# Patient Record
Sex: FEMALE | Race: WHITE | NOT HISPANIC OR LATINO | URBAN - METROPOLITAN AREA
[De-identification: names, ages, dates, MRNs, and addresses within clinical notes are randomized per-mention and may not be internally consistent; named-entity substitution may affect disease eponyms.]

---

## 2020-03-17 ENCOUNTER — AMBULATORY - HEALTHEAST (OUTPATIENT)
Dept: GERIATRICS | Facility: CLINIC | Age: 50
End: 2020-03-17

## 2020-03-18 ENCOUNTER — OFFICE VISIT - HEALTHEAST (OUTPATIENT)
Dept: GERIATRICS | Facility: CLINIC | Age: 50
End: 2020-03-18

## 2020-03-18 ENCOUNTER — AMBULATORY - HEALTHEAST (OUTPATIENT)
Dept: ADMINISTRATIVE | Facility: CLINIC | Age: 50
End: 2020-03-18

## 2020-03-18 DIAGNOSIS — E11.9 TYPE 2 DIABETES MELLITUS WITHOUT COMPLICATION, WITHOUT LONG-TERM CURRENT USE OF INSULIN (H): ICD-10-CM

## 2020-03-18 DIAGNOSIS — E78.5 HYPERLIPIDEMIA, UNSPECIFIED HYPERLIPIDEMIA TYPE: ICD-10-CM

## 2020-03-18 DIAGNOSIS — D64.9 ANEMIA, UNSPECIFIED TYPE: ICD-10-CM

## 2020-03-18 DIAGNOSIS — I89.0 LYMPHEDEMA OF BOTH LOWER EXTREMITIES: ICD-10-CM

## 2020-03-18 DIAGNOSIS — I10 ESSENTIAL HYPERTENSION: ICD-10-CM

## 2020-03-18 DIAGNOSIS — R19.7 DIARRHEA, UNSPECIFIED TYPE: ICD-10-CM

## 2020-03-18 DIAGNOSIS — F41.8 DEPRESSION WITH ANXIETY: ICD-10-CM

## 2020-03-18 DIAGNOSIS — L03.115 CELLULITIS OF RIGHT LOWER EXTREMITY: ICD-10-CM

## 2020-03-18 DIAGNOSIS — G47.30 SLEEP APNEA, UNSPECIFIED TYPE: ICD-10-CM

## 2020-03-18 DIAGNOSIS — I48.11 LONGSTANDING PERSISTENT ATRIAL FIBRILLATION (H): ICD-10-CM

## 2020-03-20 ENCOUNTER — OFFICE VISIT - HEALTHEAST (OUTPATIENT)
Dept: GERIATRICS | Facility: CLINIC | Age: 50
End: 2020-03-20

## 2020-03-20 ENCOUNTER — RECORDS - HEALTHEAST (OUTPATIENT)
Dept: LAB | Facility: CLINIC | Age: 50
End: 2020-03-20

## 2020-03-20 DIAGNOSIS — I89.0 LYMPHEDEMA: ICD-10-CM

## 2020-03-20 DIAGNOSIS — E11.9 TYPE 2 DIABETES MELLITUS WITHOUT COMPLICATION, WITH LONG-TERM CURRENT USE OF INSULIN (H): ICD-10-CM

## 2020-03-20 DIAGNOSIS — L03.115 CELLULITIS OF RIGHT LOWER EXTREMITY: ICD-10-CM

## 2020-03-20 DIAGNOSIS — I10 HYPERTENSION, UNSPECIFIED TYPE: ICD-10-CM

## 2020-03-20 DIAGNOSIS — Z79.4 TYPE 2 DIABETES MELLITUS WITHOUT COMPLICATION, WITH LONG-TERM CURRENT USE OF INSULIN (H): ICD-10-CM

## 2020-03-20 LAB
ANION GAP SERPL CALCULATED.3IONS-SCNC: 6 MMOL/L (ref 5–18)
BUN SERPL-MCNC: 9 MG/DL (ref 8–22)
CALCIUM SERPL-MCNC: 8.9 MG/DL (ref 8.5–10.5)
CHLORIDE BLD-SCNC: 100 MMOL/L (ref 98–107)
CO2 SERPL-SCNC: 32 MMOL/L (ref 22–31)
CREAT SERPL-MCNC: 0.71 MG/DL (ref 0.6–1.1)
ERYTHROCYTE [DISTWIDTH] IN BLOOD BY AUTOMATED COUNT: 25.2 % (ref 11–14.5)
GFR SERPL CREATININE-BSD FRML MDRD: >60 ML/MIN/1.73M2
GLUCOSE BLD-MCNC: 88 MG/DL (ref 70–125)
HCT VFR BLD AUTO: 32.7 % (ref 35–47)
HGB BLD-MCNC: 9.7 G/DL (ref 12–16)
MCH RBC QN AUTO: 24.8 PG (ref 27–34)
MCHC RBC AUTO-ENTMCNC: 29.7 G/DL (ref 32–36)
MCV RBC AUTO: 84 FL (ref 80–100)
PLATELET # BLD AUTO: 395 THOU/UL (ref 140–440)
PMV BLD AUTO: 10.2 FL (ref 8.5–12.5)
POTASSIUM BLD-SCNC: 3.6 MMOL/L (ref 3.5–5)
RBC # BLD AUTO: 3.91 MILL/UL (ref 3.8–5.4)
SODIUM SERPL-SCNC: 138 MMOL/L (ref 136–145)
WBC: 7.8 THOU/UL (ref 4–11)

## 2020-03-23 ENCOUNTER — OFFICE VISIT - HEALTHEAST (OUTPATIENT)
Dept: GERIATRICS | Facility: CLINIC | Age: 50
End: 2020-03-23

## 2020-03-23 DIAGNOSIS — E11.9 TYPE 2 DIABETES MELLITUS WITHOUT COMPLICATION, WITH LONG-TERM CURRENT USE OF INSULIN (H): ICD-10-CM

## 2020-03-23 DIAGNOSIS — Z79.4 TYPE 2 DIABETES MELLITUS WITHOUT COMPLICATION, WITH LONG-TERM CURRENT USE OF INSULIN (H): ICD-10-CM

## 2020-03-23 DIAGNOSIS — S81.801D WOUND OF RIGHT LOWER EXTREMITY, SUBSEQUENT ENCOUNTER: ICD-10-CM

## 2020-03-23 DIAGNOSIS — I10 HYPERTENSION, UNSPECIFIED TYPE: ICD-10-CM

## 2020-03-23 DIAGNOSIS — R53.1 GENERAL WEAKNESS: ICD-10-CM

## 2020-03-25 ENCOUNTER — OFFICE VISIT - HEALTHEAST (OUTPATIENT)
Dept: GERIATRICS | Facility: CLINIC | Age: 50
End: 2020-03-25

## 2020-03-25 DIAGNOSIS — I89.0 LYMPHEDEMA: ICD-10-CM

## 2020-03-25 DIAGNOSIS — E11.9 TYPE 2 DIABETES MELLITUS WITHOUT COMPLICATION, WITH LONG-TERM CURRENT USE OF INSULIN (H): ICD-10-CM

## 2020-03-25 DIAGNOSIS — T07.XXXA WOUNDS, MULTIPLE: ICD-10-CM

## 2020-03-25 DIAGNOSIS — I10 HYPERTENSION, UNSPECIFIED TYPE: ICD-10-CM

## 2020-03-25 DIAGNOSIS — Z79.4 TYPE 2 DIABETES MELLITUS WITHOUT COMPLICATION, WITH LONG-TERM CURRENT USE OF INSULIN (H): ICD-10-CM

## 2020-03-25 LAB
GLIADIN IGA SER-ACNC: 6.4 U/ML
GLIADIN IGG SER-ACNC: 0.4 U/ML
IGA SERPL-MCNC: 200 MG/DL (ref 65–400)
TTG IGA SER-ACNC: 0.3 U/ML
TTG IGG SER-ACNC: <0.6 U/ML

## 2020-03-27 ENCOUNTER — OFFICE VISIT - HEALTHEAST (OUTPATIENT)
Dept: GERIATRICS | Facility: CLINIC | Age: 50
End: 2020-03-27

## 2020-03-27 DIAGNOSIS — T07.XXXA WOUNDS, MULTIPLE: ICD-10-CM

## 2020-03-27 DIAGNOSIS — R29.898 LEG WEAKNESS, BILATERAL: ICD-10-CM

## 2020-03-27 DIAGNOSIS — Z79.4 TYPE 2 DIABETES MELLITUS WITHOUT COMPLICATION, WITH LONG-TERM CURRENT USE OF INSULIN (H): ICD-10-CM

## 2020-03-27 DIAGNOSIS — E11.9 TYPE 2 DIABETES MELLITUS WITHOUT COMPLICATION, WITH LONG-TERM CURRENT USE OF INSULIN (H): ICD-10-CM

## 2020-03-27 DIAGNOSIS — I10 HYPERTENSION, UNSPECIFIED TYPE: ICD-10-CM

## 2020-03-30 ENCOUNTER — AMBULATORY - HEALTHEAST (OUTPATIENT)
Dept: GERIATRICS | Facility: CLINIC | Age: 50
End: 2020-03-30

## 2021-06-06 NOTE — PROGRESS NOTES
Code Status:  FULL CODE  Visit Type: Review Of Multiple Medical Conditions (recently hospitalized for sepsis 2/2 cellulitis of multiple sites, afib with RVR and persistent diarrhea. )     Facility:  [facility] [[id number]]      Facility Type: SNF (Skilled Nursing Facility, TCU)    History of Present Illness:   Hospital Admission Date: 3/10/2020 Hospital Discharge Date: 3/17/2020      Nguyen Trejo is a 49 y.o. female who has a past medical history for DM2, morbid obesity, hx of gastric bypass, depression, anxiety, sleep apnea without CPAP, HTN, HLD, Afib on Eliquis, and anemia. She was recently hospitalized after stating she was dizzy and lightheaded on ha [employer] chat site in which a friend called 911.  Upon hospitalization she was found to have Sepsis with lactic acid 4.2 and WBC 17.0 which was determined 2/2 to cellulitis of LE and abdominal wall.  She was also found to be in Afib with RVR.  She was given digoxin and started on an amiodarone gttp with good rate control.  For her sepsis; she was started on empiric antibiotics of IV zosyn and vancomycin along with IVFs.  She develop fluid overload and was treated with IV diuretics.  I do not have a dc summary. Apparently, she became hypoxic due to her habitus and RAVI which did improve after treatment.  She had reported that she had been having diarrhea x 2 months and hadn't been eating much.  She was negative for CDiff and diarrhea improved.  She did have hyponatremia and hypokalemia 2/2 sepsis.      Today,  She reports feeling well.  She endorses poor endurance during therapy.  She story have have extreme lymphedema of BLE with blisterng and weeping.  Redness has almost completely resolved.  Her abdominal redness is resolved and she have 1 open area that is shallow and healing.  She reports that she has a habit of scratching and picking at her skin in which she does exhibit multiple scabs on her abdomen and arms  She reports that her diarrhea  is firming up but states she has 5 BMs daily without much warning.  She denies any abdominal pain or discomfort with BMs. She endorses heartburn which is new for her since being in the hospital which was relieved with TUMs. She tells me that she has not completed a full exam for a CPAP but is aware that she has apnea. She has OA in which she takes nabumetone, prn tylenol and gabapentin with good relief.      She reports that her BS are rarely above 100 and denies any symptoms.  She did have a FBS of 125 today and is concerned that this is too high.  She states her last A1c 3 months ago was 6.5    Past Medical History:   Diagnosis Date     Anemia      Anxiety      Atrial fibrillation with RVR (H)      B-complex deficiency      Cellulitis of right lower extremity      DDD (degenerative disc disease), lumbar      Depressive disorder      DM2 (diabetes mellitus, type 2) (H)      Eating disorder      GERD (gastroesophageal reflux disease)      Heart murmur      HLD (hyperlipidemia)      HTN (hypertension)      Hypovitaminosis D      Iron deficiency anemia secondary to inadequate dietary iron intake      Morbid obesity (H)      OA (osteoarthritis)      Restless leg      S/P gastric bypass      Septic shock (H)      Sinusitis      Sleep apnea      Sleep disturbance      Past Surgical History:   Procedure Laterality Date      SECTION       CHOLECYSTECTOMY       GASTRIC BYPASS       HERNIA REPAIR       Family History   Problem Relation Age of Onset     Cancer Father         bladder     Sleep apnea Father      Throat cancer Paternal Grandfather      Stomach cancer Paternal Grandfather      Social History     Socioeconomic History     Marital status: Single     Spouse name: Not on file     Number of children: Not on file     Years of education: Not on file     Highest education level: Not on file   Occupational History     Not on file   Social Needs     Financial resource strain: Not on file     Food insecurity      Worry: Not on file     Inability: Not on file     Transportation needs     Medical: Not on file     Non-medical: Not on file   Tobacco Use     Smoking status: Never Smoker     Smokeless tobacco: Never Used   Substance and Sexual Activity     Alcohol use: Not Currently     Drug use: Not on file     Sexual activity: Not on file   Lifestyle     Physical activity     Days per week: Not on file     Minutes per session: Not on file     Stress: Not on file   Relationships     Social connections     Talks on phone: Not on file     Gets together: Not on file     Attends Confucianism service: Not on file     Active member of club or organization: Not on file     Attends meetings of clubs or organizations: Not on file     Relationship status: Not on file     Intimate partner violence     Fear of current or ex partner: Not on file     Emotionally abused: Not on file     Physically abused: Not on file     Forced sexual activity: Not on file   Other Topics Concern     Not on file   Social History Narrative     Not on file       Current Outpatient Medications   Medication Sig Dispense Refill     calcium, as carbonate, (TUMS) 200 mg calcium (500 mg) chewable tablet Chew 2 tablets 4 (four) times a day as needed for heartburn.       acetaminophen (TYLENOL) 500 MG tablet Take 1,000 mg by mouth 2 (two) times a day as needed for pain.       apixaban ANTICOAGULANT (ELIQUIS) 5 mg Tab tablet Take 5 mg by mouth 2 (two) times a day.       ascorbic acid, vitamin C, (VITAMIN C) 1000 MG tablet Take 1,000 mg by mouth daily.       atorvastatin (LIPITOR) 40 MG tablet Take 40 mg by mouth at bedtime.       calcium carbonate-vitamin D3 (CALTRATE 600 PLUS D3) 600 mg(1,500mg) -400 unit per tablet Take 1 tablet by mouth daily.       cephalexin (KEFLEX) 500 MG capsule Take 1,000 mg by mouth every 6 (six) hours.       cyanocobalamin 1000 MCG tablet Take 1,000 mcg by mouth daily.       ferrous sulfate 325 (65 FE) MG tablet Take 1 tablet by mouth 2 (two) times  a day with meals.       furosemide (LASIX) 40 MG tablet Take 40 mg by mouth daily.       gabapentin (NEURONTIN) 100 MG capsule Take 100 mg by mouth 2 (two) times a day.       magnesium oxide 250 mg magnesium Tab Take 250 mg by mouth 2 (two) times a day.       metFORMIN (GLUCOPHAGE) 500 MG tablet Take 1,000 mg by mouth 2 (two) times a day with meals.       methylsulfonylmethane (MSM) 1,000 mg Tab Take 1,000 mg by mouth every morning.       metoprolol succinate (TOPROL-XL) 100 MG 24 hr tablet Take 100 mg by mouth daily.       multivitamin (MULTIPLE VITAMINS ORAL) Take 1 tablet by mouth every morning.       nabumetone (RELAFEN) 750 MG tablet Take 750 mg by mouth 2 (two) times a day with meals.       omeprazole (PRILOSEC) 20 MG capsule Take 20 mg by mouth daily before breakfast.       OXcarbazepine (TRILEPTAL) 150 MG tablet Take 150 mg by mouth 2 (two) times a day.       PARoxetine (PAXIL) 20 MG tablet Take 60 mg by mouth every morning.       traZODone (DESYREL) 150 MG tablet Take 150 mg by mouth at bedtime.       No current facility-administered medications for this visit.      No Known Allergies    There is no immunization history on file for this patient.    Post Discharge Medication Reconciliation Status: discharge medications reconciled, continue medications without change    Review of Systems   Patient denies fever, chills, headache, lightheadedness, dizziness, rhinorrhea, cough, congestion, shortness of breath, chest pain, palpitations, abdominal pain, n/v, constipation, change in appetite, dysuria, frequency, burning or pain with urination.  Other than stated in HPI all other review of systems is negative.         Physical Exam   Vital signs: /91 with most -120s, , resp 16, 96% on RA  GENERAL APPEARANCE: Well developed, well nourished, in no acute distress.  HEENT: normocephalic, atraumatic  PERRL, sclerae anicteric, conjunctivae clear and moist, EOM intact  LUNGS: Lung sounds CTA, no  adventitious sounds, respiratory effort normal.  CARD:irregular but controlled, S1, S2, without murmurs, gallops, rubs,   ABD: Soft and nontender with normal bowel sounds. Multiple scabs with open area LLQ   MSK: Muscle strength and tone were equal bilaterally  EXTREMITIES: lymphedema BLE  NEURO: Alert and oriented x 3.  Face is symmetric.  SKIN: blistering and ulceration of RLE with serous drainage, erythema contained to right shin less than the black marks marked in the hospital.  LLE has small minor blistering without drainage.  Wound on LLQ of abdomen is approximately 0.3 x 0.3cm with good granulation.  Abdomen has no erythema.   PSYCH: euthymic            Labs:    No results found for this or any previous visit (from the past 240 hour(s)).      Assessment:  1. Cellulitis of right lower extremity     2. Longstanding persistent atrial fibrillation     3. Lymphedema of both lower extremities     4. Type 2 diabetes mellitus without complication, without long-term current use of insulin (H)     5. Anemia, unspecified type     6. Diarrhea, unspecified type     7. Depression with anxiety     8. Sleep apnea, unspecified type     9. Essential hypertension     10. Hyperlipidemia, unspecified hyperlipidemia type         Plan:   Celllulitis: improving Keflex thru 3/20, check a BMP and CBC    Afib: controlled, continue with home dose Toprol and Eliquis    Lymphedema: counseled patient on the importance of improving lymphedema.  On lasix 40mg daily.  PT to put on lymphedema wraps if they fall of with ambulation could consider Unna boot or profore dressings.  Counseled patient to elevated legs and float heels.     DM: controlled, Metformin    Anemia: Check a CBC continue with ferous sulfate.     Diarrhea: no diagnosis, IBS vs Celiac. Discussed these dx with patient.  Discussed FODMAP diet and gluten free diet and how to go about starting.  Will check Celiac sprue.  Consider immodium if needed.     Depression/anxiety: sees  psychiatry, continue oxcarbazepine, and paxil. On trazodone for sleep .     Sleep apnea: will need to follow up for CPAP.  Discussed not adding Benadryl for itching as she is already on a highly sedating sleep medication.     HTN: Stable, continue metoprolol.     HLD: will defer to primary as she is not on a statin.      35 total minutes spent with 25 minutes spent face to face with patient in counseling and coordination of the above plan of care.     Electronically signed by: Caty Wheat CNP

## 2021-06-07 NOTE — PROGRESS NOTES
[facility]    Facility:   [facility] [[id number]]   Code Status: FULL CODE      CHIEF COMPLAINT/REASON FOR VISIT:  Chief Complaint   Patient presents with     Follow Up     rehab, legs, wounds, htn, dm       HISTORY:      HPI: Nguyen is a 49 y.o. female who I had the pleasure revisiting with secondary to her hospitalization March 10, 2020 through March 17, 2020 secondary septic shock secondary to bilateral lower extremity and abdominal wall cellulitis.  The abdominal wall is no longer an issue.  The right is greater than the left regarding some blisters as well as wounds but they are starting to scab and heal and she will be finishing up her cephalexin on March 30.  Otherwise no other pain or other problems.  She is on Eliquis secondary to A. fib.  She also does have chronic anemia see results below of her most recent hemoglobin is on ferrous sulfate twice daily.  For chronic pain on gabapentin.  For sleep is on trazodone.  Her blood sugars in the morning have been ranging 92-1 19 and towards the p.m. 89-1 24 only on metformin.  For the lymphedema is on furosemide 40 mg daily.  She has been normotensive and afebrile.  Moods are stable.  She is now going up about 7 stairs she needs to do a treadmill to go home she does figure that should be reviewed that by this weekend she like to be discharged by the weekend.  She does live in an apartment and has a daughter.  Earlier in the week she is only able to do 2 stairs.    Past Medical History:   Diagnosis Date     Anemia      Anxiety      Atrial fibrillation with RVR (H)      B-complex deficiency      Cellulitis of right lower extremity      DDD (degenerative disc disease), lumbar      Depressive disorder      DM2 (diabetes mellitus, type 2) (H)      Eating disorder      GERD (gastroesophageal reflux disease)      Heart murmur      HLD (hyperlipidemia)      HTN (hypertension)      Hypovitaminosis D      Iron deficiency anemia  secondary to inadequate dietary iron intake      Morbid obesity (H)      OA (osteoarthritis)      Restless leg      S/P gastric bypass      Septic shock (H)      Sinusitis      Sleep apnea      Sleep disturbance              Family History   Problem Relation Age of Onset     Cancer Father         bladder     Sleep apnea Father      Throat cancer Paternal Grandfather      Stomach cancer Paternal Grandfather      Social History     Socioeconomic History     Marital status: Single     Spouse name: Not on file     Number of children: Not on file     Years of education: Not on file     Highest education level: Not on file   Occupational History     Not on file   Social Needs     Financial resource strain: Not on file     Food insecurity     Worry: Not on file     Inability: Not on file     Transportation needs     Medical: Not on file     Non-medical: Not on file   Tobacco Use     Smoking status: Never Smoker     Smokeless tobacco: Never Used   Substance and Sexual Activity     Alcohol use: Not Currently     Drug use: Not on file     Sexual activity: Not on file   Lifestyle     Physical activity     Days per week: Not on file     Minutes per session: Not on file     Stress: Not on file   Relationships     Social connections     Talks on phone: Not on file     Gets together: Not on file     Attends Pentecostal service: Not on file     Active member of club or organization: Not on file     Attends meetings of clubs or organizations: Not on file     Relationship status: Not on file     Intimate partner violence     Fear of current or ex partner: Not on file     Emotionally abused: Not on file     Physically abused: Not on file     Forced sexual activity: Not on file   Other Topics Concern     Not on file   Social History Narrative     Not on file         Review of Systems  She currently denies any new symptoms of chills or fever coughing wheezing chest pain dizziness vertigo nausea vomiting diarrhea dysuria flulike symptoms  headache or stiff neck.  History of obesity sleep apnea gastric bypass surgery diabetes A. fib hypertension and hyperlipidemia.    Current Outpatient Medications   Medication Sig     acetaminophen (TYLENOL) 500 MG tablet Take 1,000 mg by mouth 2 (two) times a day as needed for pain.     apixaban ANTICOAGULANT (ELIQUIS) 5 mg Tab tablet Take 5 mg by mouth 2 (two) times a day.     ascorbic acid, vitamin C, (VITAMIN C) 1000 MG tablet Take 1,000 mg by mouth daily.     atorvastatin (LIPITOR) 40 MG tablet Take 40 mg by mouth at bedtime.     calcium carbonate-vitamin D3 (CALTRATE 600 PLUS D3) 600 mg(1,500mg) -400 unit per tablet Take 1 tablet by mouth daily.     calcium, as carbonate, (TUMS) 200 mg calcium (500 mg) chewable tablet Chew 2 tablets 4 (four) times a day as needed for heartburn.     cephalexin (KEFLEX) 500 MG capsule Take 1,000 mg by mouth every 6 (six) hours.     cyanocobalamin 1000 MCG tablet Take 1,000 mcg by mouth daily.     ferrous sulfate 325 (65 FE) MG tablet Take 1 tablet by mouth 2 (two) times a day with meals.     furosemide (LASIX) 40 MG tablet Take 40 mg by mouth daily.     gabapentin (NEURONTIN) 100 MG capsule Take 100 mg by mouth 2 (two) times a day.     magnesium oxide 250 mg magnesium Tab Take 250 mg by mouth 2 (two) times a day.     metFORMIN (GLUCOPHAGE) 500 MG tablet Take 1,000 mg by mouth 2 (two) times a day with meals.     methylsulfonylmethane (MSM) 1,000 mg Tab Take 1,000 mg by mouth every morning.     metoprolol succinate (TOPROL-XL) 100 MG 24 hr tablet Take 100 mg by mouth daily.     multivitamin (MULTIPLE VITAMINS ORAL) Take 1 tablet by mouth every morning.     nabumetone (RELAFEN) 750 MG tablet Take 750 mg by mouth 2 (two) times a day with meals.     omeprazole (PRILOSEC) 20 MG capsule Take 20 mg by mouth daily before breakfast.     OXcarbazepine (TRILEPTAL) 150 MG tablet Take 150 mg by mouth 2 (two) times a day.     PARoxetine (PAXIL) 20 MG tablet Take 60 mg by mouth every morning.      traZODone (DESYREL) 150 MG tablet Take 150 mg by mouth at bedtime.       There were no vitals filed for this visit.  Blood pressure 103/61 pulse 76 respirations 18 temperature 97.9 saturation room air 93%  Physical Exam  Head is normocephalic.  Conjunctiva is pink sclerae clear.  Neck is supple without adenopathy.  Lung sounds are clear throughout. Cardiovascular does have anirregularity.  Also does have chronic lower extremity lymphedema.  Gastrointestinal nontender protuberant with a pannus.  No secondary infection or cellulitis.  Psychiatric: Pleasant affect.    LABS:   Lab Results   Component Value Date    WBC 7.8 03/20/2020    HGB 9.7 (L) 03/20/2020    HCT 32.7 (L) 03/20/2020    MCV 84 03/20/2020     03/20/2020     Results for orders placed or performed in visit on 03/20/20   Basic Metabolic Panel   Result Value Ref Range    Sodium 138 136 - 145 mmol/L    Potassium 3.6 3.5 - 5.0 mmol/L    Chloride 100 98 - 107 mmol/L    CO2 32 (H) 22 - 31 mmol/L    Anion Gap, Calculation 6 5 - 18 mmol/L    Glucose 88 70 - 125 mg/dL    Calcium 8.9 8.5 - 10.5 mg/dL    BUN 9 8 - 22 mg/dL    Creatinine 0.71 0.60 - 1.10 mg/dL    GFR MDRD Af Amer >60 >60 mL/min/1.73m2    GFR MDRD Non Af Amer >60 >60 mL/min/1.73m2       No results found for: HGBA1C      ASSESSMENT:      ICD-10-CM    1. Wounds, multiple  T07.XXXA    2. Type 2 diabetes mellitus without complication, with long-term current use of insulin (H)  E11.9     Z79.4    3. Hypertension, unspecified type  I10    4. Lymphedema  I89.0        PLAN:    The legs actually look like they have improved nicely so on cephalexin until March 30.  Blood sugars look good blood pressures look great and the fact that she is increased now from 2 stairs up to 7 stairs her hope is to do these 8 stairs in the next day or 2 since the plan is to be able to go home by the weekend.      Electronically signed by: Michael Duane Johnson, CNP

## 2021-06-07 NOTE — PROGRESS NOTES
[facility]    Facility:   [facility] [[id number]]   Code Status: FULL CODE      CHIEF COMPLAINT/REASON FOR VISIT:  Chief Complaint   Patient presents with     Follow Up     REHAB, legs, wounds, bp, bg       HISTORY:      HPI: Nguyen is a 49 y.o. female who I had the opportunity to revisit with secondary to her hospitalization March 10 through March 17, 2020 secondary septic shock secondary to bilateral lower extremity and abdominal wall cellulitis.  She is actually doing quite well she is performing in therapy 23 stairs at this time she needs to go weight in order to go home.  She like to go home sooner than later.  For chronic pain is on gabapentin 100 mg twice daily rarely takes a Tylenol as needed.  Primarily her right lower extremity does have the blisters which are starting to dry and healed quite nicely they are wrapped.  Does have a chronic history of lymphedema.  She has been normotensive afebrile and also on room air.  Blood sugars in the morning have been ranging 90-1 25 and in the p.m. .  Moods have been stable.  No respiratory issues.  Working with therapy for strength, conditioning, stamina.    Past Medical History:   Diagnosis Date     Anemia      Anxiety      Atrial fibrillation with RVR (H)      B-complex deficiency      Cellulitis of right lower extremity      DDD (degenerative disc disease), lumbar      Depressive disorder      DM2 (diabetes mellitus, type 2) (H)      Eating disorder      GERD (gastroesophageal reflux disease)      Heart murmur      HLD (hyperlipidemia)      HTN (hypertension)      Hypovitaminosis D      Iron deficiency anemia secondary to inadequate dietary iron intake      Morbid obesity (H)      OA (osteoarthritis)      Restless leg      S/P gastric bypass      Septic shock (H)      Sinusitis      Sleep apnea      Sleep disturbance              Family History   Problem Relation Age of Onset     Cancer Father         bladder      Sleep apnea Father      Throat cancer Paternal Grandfather      Stomach cancer Paternal Grandfather      Social History     Socioeconomic History     Marital status: Single     Spouse name: Not on file     Number of children: Not on file     Years of education: Not on file     Highest education level: Not on file   Occupational History     Not on file   Social Needs     Financial resource strain: Not on file     Food insecurity     Worry: Not on file     Inability: Not on file     Transportation needs     Medical: Not on file     Non-medical: Not on file   Tobacco Use     Smoking status: Never Smoker     Smokeless tobacco: Never Used   Substance and Sexual Activity     Alcohol use: Not Currently     Drug use: Not on file     Sexual activity: Not on file   Lifestyle     Physical activity     Days per week: Not on file     Minutes per session: Not on file     Stress: Not on file   Relationships     Social connections     Talks on phone: Not on file     Gets together: Not on file     Attends [employer] service: Not on file     Active member of club or organization: Not on file     Attends meetings of clubs or organizations: Not on file     Relationship status: Not on file     Intimate partner violence     Fear of current or ex partner: Not on file     Emotionally abused: Not on file     Physically abused: Not on file     Forced sexual activity: Not on file   Other Topics Concern     Not on file   Social History Narrative     Not on file         Review of Systems  She currently denies any new symptoms of chills or fever coughing wheezing chest pain dizziness vertigo nausea vomiting diarrhea dysuria flulike symptoms headache or stiff neck.  History of obesity sleep apnea gastric bypass surgery diabetes A. fib hypertension and hyperlipidemia.    Current Outpatient Medications   Medication Sig     acetaminophen (TYLENOL) 500 MG tablet Take 1,000 mg by mouth 2 (two) times a day as needed for pain.     apixaban ANTICOAGULANT  (ELIQUIS) 5 mg Tab tablet Take 5 mg by mouth 2 (two) times a day.     ascorbic acid, vitamin C, (VITAMIN C) 1000 MG tablet Take 1,000 mg by mouth daily.     atorvastatin (LIPITOR) 40 MG tablet Take 40 mg by mouth at bedtime.     calcium carbonate-vitamin D3 (CALTRATE 600 PLUS D3) 600 mg(1,500mg) -400 unit per tablet Take 1 tablet by mouth daily.     calcium, as carbonate, (TUMS) 200 mg calcium (500 mg) chewable tablet Chew 2 tablets 4 (four) times a day as needed for heartburn.     cephalexin (KEFLEX) 500 MG capsule Take 1,000 mg by mouth every 6 (six) hours.     cyanocobalamin 1000 MCG tablet Take 1,000 mcg by mouth daily.     ferrous sulfate 325 (65 FE) MG tablet Take 1 tablet by mouth 2 (two) times a day with meals.     furosemide (LASIX) 40 MG tablet Take 40 mg by mouth daily.     gabapentin (NEURONTIN) 100 MG capsule Take 100 mg by mouth 2 (two) times a day.     magnesium oxide 250 mg magnesium Tab Take 250 mg by mouth 2 (two) times a day.     metFORMIN (GLUCOPHAGE) 500 MG tablet Take 1,000 mg by mouth 2 (two) times a day with meals.     methylsulfonylmethane (MSM) 1,000 mg Tab Take 1,000 mg by mouth every morning.     metoprolol succinate (TOPROL-XL) 100 MG 24 hr tablet Take 100 mg by mouth daily.     multivitamin (MULTIPLE VITAMINS ORAL) Take 1 tablet by mouth every morning.     nabumetone (RELAFEN) 750 MG tablet Take 750 mg by mouth 2 (two) times a day with meals.     omeprazole (PRILOSEC) 20 MG capsule Take 20 mg by mouth daily before breakfast.     OXcarbazepine (TRILEPTAL) 150 MG tablet Take 150 mg by mouth 2 (two) times a day.     PARoxetine (PAXIL) 20 MG tablet Take 60 mg by mouth every morning.     traZODone (DESYREL) 150 MG tablet Take 150 mg by mouth at bedtime.       There were no vitals filed for this visit.  Blood pressure 120/71 pulse 82 respirations 18 temperature 98.0 saturation room air 98%  Physical Exam  Head is normocephalic.  Conjunctiva is pink sclerae clear.  Neck is supple without  adenopathy.  Lung sounds are clear throughout. Cardiovascular does have a slight irregularity.  Also does have chronic lower extremity lymphedema.  Gastrointestinal nontender protuberant with a pannus.  No secondary infection or cellulitis.  Psychiatric: Pleasant affect.  LABS:   Lab Results   Component Value Date    WBC 7.8 03/20/2020    HGB 9.7 (L) 03/20/2020    HCT 32.7 (L) 03/20/2020    MCV 84 03/20/2020     03/20/2020     Results for orders placed or performed in visit on 03/20/20   Basic Metabolic Panel   Result Value Ref Range    Sodium 138 136 - 145 mmol/L    Potassium 3.6 3.5 - 5.0 mmol/L    Chloride 100 98 - 107 mmol/L    CO2 32 (H) 22 - 31 mmol/L    Anion Gap, Calculation 6 5 - 18 mmol/L    Glucose 88 70 - 125 mg/dL    Calcium 8.9 8.5 - 10.5 mg/dL    BUN 9 8 - 22 mg/dL    Creatinine 0.71 0.60 - 1.10 mg/dL    GFR MDRD Af Amer >60 >60 mL/min/1.73m2    GFR MDRD Non Af Amer >60 >60 mL/min/1.73m2       No results found for: ALT, AST, GGT, ALKPHOS, BILITOT      ASSESSMENT:      ICD-10-CM    1. Wound of right lower extremity, subsequent encounter  S81.801D    2. Type 2 diabetes mellitus without complication, with long-term current use of insulin (H)  E11.9     Z79.4    3. Hypertension, unspecified type  I10    4. General weakness  R53.1        PLAN:    The right leg in particular is healing up quite nicely with the blisters are being wrapped.  The lymphedema is is not delaying her treatment.  She finished up the cephalexin on March 30.  Blood sugars look good blood pressures look great lower extremity dry skin areas.  Continue with [employer] for A. fib.  Continue to work with therapy group for strengthening conditioning to get back to.  He did not have any other questions        Electronically signed by: Michael Duane Johnson, CNP

## 2021-06-07 NOTE — PROGRESS NOTES
[facility]    Facility:   [facility] [[id number]]   Code Status: FULL CODE      CHIEF COMPLAINT/REASON FOR VISIT:  Chief Complaint   Patient presents with     Follow Up     rehab, legs, wounds, dm, htn,       HISTORY:      HPI: Nguyen is a 49 y.o. female who I had the opportunity to revisit with secondary to not only her chronic medical conditions but also recent hospitalization March 10-17, 2020 secondary septic shock from bilateral lower extremity and abdominal wound cellulitis.  She does have lymphedema and does have primarily drying blisters on her right lower extremity.  There is no abdominal wounds.  Will finish up her cephalexin on the 30th.  The leg wounds look pretty good there is currently being treated with xerofoam and then a Kerlix dressing.  I did debride these dried wounds today.  The base looks wonderful on the bed looks wonderful and there is no secondary infection so at this point we need to dry them out so we will put a Telfa dressing to absorb the secretions and the wound itself and then wrapped it with Kerlix and clean it up twice daily with saline.  Otherwise she is not having any discomfort.  She has been normotensive and afebrile.  Blood sugars in the morning ranging 92-1 26 and towards the p.m. 89-1 24.  She now is performing 8 stairs and that was her goals goals to be able to go home soon.  Earlier in the week she is only doing 7 stairs.  Her appetite is good.  There have been no colds or flus or other problems.  Is able to use walker.    Past Medical History:   Diagnosis Date     Anemia      Anxiety      Atrial fibrillation with RVR (H)      B-complex deficiency      Cellulitis of right lower extremity      DDD (degenerative disc disease), lumbar      Depressive disorder      DM2 (diabetes mellitus, type 2) (H)      Eating disorder      GERD (gastroesophageal reflux disease)      Heart murmur      HLD (hyperlipidemia)      HTN (hypertension)       Hypovitaminosis D      Iron deficiency anemia secondary to inadequate dietary iron intake      Morbid obesity (H)      OA (osteoarthritis)      Restless leg      S/P gastric bypass      Septic shock (H)      Sinusitis      Sleep apnea      Sleep disturbance              Family History   Problem Relation Age of Onset     Cancer Father         bladder     Sleep apnea Father      Throat cancer Paternal Grandfather      Stomach cancer Paternal Grandfather      Social History     Socioeconomic History     Marital status: Single     Spouse name: Not on file     Number of children: Not on file     Years of education: Not on file     Highest education level: Not on file   Occupational History     Not on file   Social Needs     Financial resource strain: Not on file     Food insecurity     Worry: Not on file     Inability: Not on file     Transportation needs     Medical: Not on file     Non-medical: Not on file   Tobacco Use     Smoking status: Never Smoker     Smokeless tobacco: Never Used   Substance and Sexual Activity     Alcohol use: Not Currently     Drug use: Not on file     Sexual activity: Not on file   Lifestyle     Physical activity     Days per week: Not on file     Minutes per session: Not on file     Stress: Not on file   Relationships     Social connections     Talks on phone: Not on file     Gets together: Not on file     Attends Faith service: Not on file     Active member of club or organization: Not on file     Attends meetings of clubs or organizations: Not on file     Relationship status: Not on file     Intimate partner violence     Fear of current or ex partner: Not on file     Emotionally abused: Not on file     Physically abused: Not on file     Forced sexual activity: Not on file   Other Topics Concern     Not on file   Social History Narrative     Not on file         Review of Systems  She currently denies any new symptoms of chills or fever coughing wheezing chest pain dizziness vertigo  nausea vomiting diarrhea dysuria flulike symptoms headache or stiff neck.  History of obesity sleep apnea gastric bypass surgery diabetes A. fib hypertension and hyperlipidemia.         Current Outpatient Medications:      acetaminophen (TYLENOL) 500 MG tablet, Take 1,000 mg by mouth 2 (two) times a day as needed for pain., Disp: , Rfl:      apixaban ANTICOAGULANT (ELIQUIS) 5 mg Tab tablet, Take 5 mg by mouth 2 (two) times a day., Disp: , Rfl:      ascorbic acid, vitamin C, (VITAMIN C) 1000 MG tablet, Take 1,000 mg by mouth daily., Disp: , Rfl:      atorvastatin (LIPITOR) 40 MG tablet, Take 40 mg by mouth at bedtime., Disp: , Rfl:      calcium carbonate-vitamin D3 (CALTRATE 600 PLUS D3) 600 mg(1,500mg) -400 unit per tablet, Take 1 tablet by mouth daily., Disp: , Rfl:      calcium, as carbonate, (TUMS) 200 mg calcium (500 mg) chewable tablet, Chew 2 tablets 4 (four) times a day as needed for heartburn., Disp: , Rfl:      cephalexin (KEFLEX) 500 MG capsule, Take 1,000 mg by mouth every 6 (six) hours., Disp: , Rfl:      cyanocobalamin 1000 MCG tablet, Take 1,000 mcg by mouth daily., Disp: , Rfl:      ferrous sulfate 325 (65 FE) MG tablet, Take 1 tablet by mouth 2 (two) times a day with meals., Disp: , Rfl:      furosemide (LASIX) 40 MG tablet, Take 40 mg by mouth daily., Disp: , Rfl:      gabapentin (NEURONTIN) 100 MG capsule, Take 100 mg by mouth 2 (two) times a day., Disp: , Rfl:      magnesium oxide 250 mg magnesium Tab, Take 250 mg by mouth 2 (two) times a day., Disp: , Rfl:      metFORMIN (GLUCOPHAGE) 500 MG tablet, Take 1,000 mg by mouth 2 (two) times a day with meals., Disp: , Rfl:      methylsulfonylmethane (MSM) 1,000 mg Tab, Take 1,000 mg by mouth every morning., Disp: , Rfl:      metoprolol succinate (TOPROL-XL) 100 MG 24 hr tablet, Take 100 mg by mouth daily., Disp: , Rfl:      multivitamin (MULTIPLE VITAMINS ORAL), Take 1 tablet by mouth every morning., Disp: , Rfl:      nabumetone (RELAFEN) 750 MG tablet,  Take 750 mg by mouth 2 (two) times a day with meals., Disp: , Rfl:      omeprazole (PRILOSEC) 20 MG capsule, Take 20 mg by mouth daily before breakfast., Disp: , Rfl:      OXcarbazepine (TRILEPTAL) 150 MG tablet, Take 150 mg by mouth 2 (two) times a day., Disp: , Rfl:      PARoxetine (PAXIL) 20 MG tablet, Take 60 mg by mouth every morning., Disp: , Rfl:      traZODone (DESYREL) 150 MG tablet, Take 150 mg by mouth at bedtime., Disp: , Rfl:     There were no vitals filed for this visit.  Blood pressure 114/71 pulse 84 respirations 18 temperature 98.5  Physical Exam  Head is normocephalic.  Conjunctiva is pink sclerae clear.  Neck is supple without adenopathy.  Lung sounds are clear throughout. Cardiovascular does have an irregularity.  Also does have chronic lower extremity lymphedema.  Gastrointestinal nontender protuberant with a pannus.  No secondary infection or cellulitis.  Psychiatric: Pleasant affect.    Primarily the right lower extremity does have the blisters which are now resolving and drying out as well as being cleansed and treated twice daily.  LABS:   Lab Results   Component Value Date    WBC 7.8 03/20/2020    HGB 9.7 (L) 03/20/2020    HCT 32.7 (L) 03/20/2020    MCV 84 03/20/2020     03/20/2020     Results for orders placed or performed in visit on 03/20/20   Basic Metabolic Panel   Result Value Ref Range    Sodium 138 136 - 145 mmol/L    Potassium 3.6 3.5 - 5.0 mmol/L    Chloride 100 98 - 107 mmol/L    CO2 32 (H) 22 - 31 mmol/L    Anion Gap, Calculation 6 5 - 18 mmol/L    Glucose 88 70 - 125 mg/dL    Calcium 8.9 8.5 - 10.5 mg/dL    BUN 9 8 - 22 mg/dL    Creatinine 0.71 0.60 - 1.10 mg/dL    GFR MDRD Af Amer >60 >60 mL/min/1.73m2    GFR MDRD Non Af Amer >60 >60 mL/min/1.73m2       No results found for: HGBA1C      ASSESSMENT:      ICD-10-CM    1. Wounds, multiple  T07.XXXA    2. Type 2 diabetes mellitus without complication, with long-term current use of insulin (H)  E11.9     Z79.4    3. Leg  weakness, bilateral  R29.898    4. Hypertension, unspecified type  I10        PLAN:    I was able to do wound debriding on his right lower extremity wounds today.  The actually pretty good she finished up her cephalexin.  Otherwise blood pressures and blood sugars look pretty good.  She is also made improvements with therapy.    Documentation purposes total visit 35 minutes which over 50% was spent talking with patient about her chronic medical conditions her stay in the transitional care but also doing wound care and debriding the right lower extremity wounds.      Electronically signed by: Michael Duane Johnson, CNP

## 2021-06-07 NOTE — PROGRESS NOTES
[facility]    Facility:   [facility] [[id number]]   Code Status: FULL CODE      CHIEF COMPLAINT/REASON FOR VISIT:  Chief Complaint   Patient presents with     Follow Up     rehab, legs, lymphed, dm, htn, weak       HISTORY:      HPI: Nguyen is a 49 y.o. female who I had the pleasure to visit with secondary to her hospitalization March 10, 2020 through March 17, 2020 secondary to septic shock secondary to bilateral lower extremity and abdominal wall.  The septic shock did resolve.  The cellulitis was bilateral lower extremities with an abdominal wall cellulitis.  She is obese.  She also is on chronic anticoagulation with apixaban for atrial fibrillation.  She also did have diarrhea and C. difficile was negative.  The kidneys were evaluated secondary to the septic shock.  The lactic acidosis was resolved.  Does have a history of sleep apnea does not use a CPAP machine.  The hypokalemia was corrected as well as the hyponatremia.  The metformin was held for her diabetes.  She also does have depression and anxiety.  Sent to the transitional care unit secondary to deconditioning.  She currently is in the transitional care unit.  Had a pleasant conversation with her as well.  She has been normotensive and afebrile and also on room air.  She is on Eliquis 5 mg twice daily.  Her hemoglobin did come back today at 9.7 she is on ferrous sulfate twice daily.  For chronic pain she is on gabapentin 100 mg twice daily as well as nabumetone 750 mg twice daily.  For the hypertension as well as lymphedema is on furosemide 40 mg is also a BMP scheduled for today.  Her moods have been stable and does see a therapist.  Regarding her diabetes the Accu-Cheks in the morning ranging 90-1 25 and towards the p.m. 93-1 01.  She does live out here in the [place] area in an apartment.  She does live with her daughter.  She does have a couple of stairs and normally is able to use a crutch for the  stairs but the therapist are working with a walker.  Currently the left leg looks unremarkable except for lymphedema.  There is no cellulitis in the abdominal wall and she does have a pannus.  Then the right lower extremity does have blisters on the lower anterior and posterior legs they are being dressed and treated once daily and there are no secondary infection.  Should be on the Keflex until March 30.    Past Medical History:   Diagnosis Date     Anemia      Anxiety      Atrial fibrillation with RVR (H)      B-complex deficiency      Cellulitis of right lower extremity      DDD (degenerative disc disease), lumbar      Depressive disorder      DM2 (diabetes mellitus, type 2) (H)      Eating disorder      GERD (gastroesophageal reflux disease)      Heart murmur      HLD (hyperlipidemia)      HTN (hypertension)      Hypovitaminosis D      Iron deficiency anemia secondary to inadequate dietary iron intake      Morbid obesity (H)      OA (osteoarthritis)      Restless leg      S/P gastric bypass      Septic shock (H)      Sinusitis      Sleep apnea      Sleep disturbance              Family History   Problem Relation Age of Onset     Cancer Father         bladder     Sleep apnea Father      Throat cancer Paternal Grandfather      Stomach cancer Paternal Grandfather      Social History     Socioeconomic History     Marital status: Single     Spouse name: Not on file     Number of children: Not on file     Years of education: Not on file     Highest education level: Not on file   Occupational History     Not on file   Social Needs     Financial resource strain: Not on file     Food insecurity     Worry: Not on file     Inability: Not on file     Transportation needs     Medical: Not on file     Non-medical: Not on file   Tobacco Use     Smoking status: Never Smoker     Smokeless tobacco: Never Used   Substance and Sexual Activity     Alcohol use: Not Currently     Drug use: Not on file     Sexual activity: Not on file    Lifestyle     Physical activity     Days per week: Not on file     Minutes per session: Not on file     Stress: Not on file   Relationships     Social connections     Talks on phone: Not on file     Gets together: Not on file     Attends Jehovah's witness service: Not on file     Active member of club or organization: Not on file     Attends meetings of clubs or organizations: Not on file     Relationship status: Not on file     Intimate partner violence     Fear of current or ex partner: Not on file     Emotionally abused: Not on file     Physically abused: Not on file     Forced sexual activity: Not on file   Other Topics Concern     Not on file   Social History Narrative     Not on file         Review of Systems  She currently denies any new symptoms of chills or fever coughing wheezing chest pain dizziness vertigo nausea vomiting diarrhea dysuria flulike symptoms headache or stiff neck.  History of obesity sleep apnea gastric bypass surgery diabetes A. fib hypertension and hyperlipidemia.    Current Outpatient Medications   Medication Sig     acetaminophen (TYLENOL) 500 MG tablet Take 1,000 mg by mouth 2 (two) times a day as needed for pain.     apixaban ANTICOAGULANT (ELIQUIS) 5 mg Tab tablet Take 5 mg by mouth 2 (two) times a day.     ascorbic acid, vitamin C, (VITAMIN C) 1000 MG tablet Take 1,000 mg by mouth daily.     atorvastatin (LIPITOR) 40 MG tablet Take 40 mg by mouth at bedtime.     calcium carbonate-vitamin D3 (CALTRATE 600 PLUS D3) 600 mg(1,500mg) -400 unit per tablet Take 1 tablet by mouth daily.     calcium, as carbonate, (TUMS) 200 mg calcium (500 mg) chewable tablet Chew 2 tablets 4 (four) times a day as needed for heartburn.     cephalexin (KEFLEX) 500 MG capsule Take 1,000 mg by mouth every 6 (six) hours.     cyanocobalamin 1000 MCG tablet Take 1,000 mcg by mouth daily.     ferrous sulfate 325 (65 FE) MG tablet Take 1 tablet by mouth 2 (two) times a day with meals.     furosemide (LASIX) 40 MG tablet  Take 40 mg by mouth daily.     gabapentin (NEURONTIN) 100 MG capsule Take 100 mg by mouth 2 (two) times a day.     magnesium oxide 250 mg magnesium Tab Take 250 mg by mouth 2 (two) times a day.     metFORMIN (GLUCOPHAGE) 500 MG tablet Take 1,000 mg by mouth 2 (two) times a day with meals.     methylsulfonylmethane (MSM) 1,000 mg Tab Take 1,000 mg by mouth every morning.     metoprolol succinate (TOPROL-XL) 100 MG 24 hr tablet Take 100 mg by mouth daily.     multivitamin (MULTIPLE VITAMINS ORAL) Take 1 tablet by mouth every morning.     nabumetone (RELAFEN) 750 MG tablet Take 750 mg by mouth 2 (two) times a day with meals.     omeprazole (PRILOSEC) 20 MG capsule Take 20 mg by mouth daily before breakfast.     OXcarbazepine (TRILEPTAL) 150 MG tablet Take 150 mg by mouth 2 (two) times a day.     PARoxetine (PAXIL) 20 MG tablet Take 60 mg by mouth every morning.     traZODone (DESYREL) 150 MG tablet Take 150 mg by mouth at bedtime.       There were no vitals filed for this visit.  Blood pressure 116/72 pulse 79 respirations 18 temperature 96.8 saturation room air 98%  Physical Exam  Head is normocephalic.  Conjunctiva is pink sclerae clear.  Neck is supple without adenopathy.  Lung sounds are clear throughout.  Cardiovascular does have a slight irregularity.  Also does have chronic lower extremity lymphedema.  Gastrointestinal nontender protuberant with a pannus.  No secondary infection or cellulitis.  Psychiatric: Pleasant affect.  LABS:   Lab Results   Component Value Date    WBC 7.8 03/20/2020    HGB 9.7 (L) 03/20/2020    HCT 32.7 (L) 03/20/2020    MCV 84 03/20/2020     03/20/2020     Results for orders placed or performed in visit on 03/20/20   Basic Metabolic Panel   Result Value Ref Range    Sodium 138 136 - 145 mmol/L    Potassium 3.6 3.5 - 5.0 mmol/L    Chloride 100 98 - 107 mmol/L    CO2 32 (H) 22 - 31 mmol/L    Anion Gap, Calculation 6 5 - 18 mmol/L    Glucose 88 70 - 125 mg/dL    Calcium 8.9 8.5 - 10.5  mg/dL    BUN 9 8 - 22 mg/dL    Creatinine 0.71 0.60 - 1.10 mg/dL    GFR MDRD Af Amer >60 >60 mL/min/1.73m2    GFR MDRD Non Af Amer >60 >60 mL/min/1.73m2       No results found for: ALT, AST, GGT, ALKPHOS, BILITOT  No results found for: LABPROT, ALBUMIN      ASSESSMENT:      ICD-10-CM    1. Cellulitis of right lower extremity  L03.115    2. Type 2 diabetes mellitus without complication, with long-term current use of insulin (H)  E11.9     Z79.4    3. Hypertension, unspecified type  I10    4. Lymphedema  I89.0        PLAN:    Continue to manage and follow her chronic medical conditions seems pretty stable up to this point.  We will add vitamin C and zinc secondary to the wounds but also for the anemia.  Continue to monitor blood pressures and her blood sugars as well as the rehabilitation process.  She is glad that she is here to work on her therapy as well as doing stairs at home working on her strength and overall conditioning.  Had a pleasant visit with her today and she did not have any other questions or other concerns.    For documentation purposes total visit 35 minutes which over 50% was spent with the patient going over her care her medications her stay in the hospital recent laboratory studies and coordination of care.      Electronically signed by: Michael Duane Johnson, CNP

## 2021-06-20 NOTE — LETTER
Letter by Caty Wheat CNP at      Author: Caty Wheat CNP Service: -- Author Type: --    Filed:  Encounter Date: 3/18/2020 Status: (Other)         Patient: Nguyen Trejo   MR Number: [id number]   YOB: 1970   Date of Visit: 3/18/2020     Code Status:  FULL CODE  Visit Type: Review Of Multiple Medical Conditions (recently hospitalized for sepsis 2/2 cellulitis of multiple sites, afib with RVR and persistent diarrhea. )     Facility:  [facility] [[id number]]      Facility Type: SNF (Skilled Nursing Facility, TCU)    History of Present Illness:   Hospital Admission Date: 3/10/2020 Hospital Discharge Date: 3/17/2020      Nguyen Trejo is a 49 y.o. female who has a past medical history for DM2, morbid obesity, hx of gastric bypass, depression, anxiety, sleep apnea without CPAP, HTN, HLD, Afib on Eliquis, and anemia. She was recently hospitalized after stating she was dizzy and lightheaded on ha [employer] chat site in which a friend called 911.  Upon hospitalization she was found to have Sepsis with lactic acid 4.2 and WBC 17.0 which was determined 2/2 to cellulitis of LE and abdominal wall.  She was also found to be in Afib with RVR.  She was given digoxin and started on an amiodarone gttp with good rate control.  For her sepsis; she was started on empiric antibiotics of IV zosyn and vancomycin along with IVFs.  She develop fluid overload and was treated with IV diuretics.  I do not have a dc summary. Apparently, she became hypoxic due to her habitus and RAVI which did improve after treatment.  She had reported that she had been having diarrhea x 2 months and hadn't been eating much.  She was negative for CDiff and diarrhea improved.  She did have hyponatremia and hypokalemia 2/2 sepsis.      Today,  She reports feeling well.  She endorses poor endurance during therapy.  She story have have extreme lymphedema of BLE with blisterng and weeping.  Redness has almost completely  resolved.  Her abdominal redness is resolved and she have 1 open area that is shallow and healing.  She reports that she has a habit of scratching and picking at her skin in which she does exhibit multiple scabs on her abdomen and arms  She reports that her diarrhea is firming up but states she has 5 BMs daily without much warning.  She denies any abdominal pain or discomfort with BMs. She endorses heartburn which is new for her since being in the hospital which was relieved with TUMs. She tells me that she has not completed a full exam for a CPAP but is aware that she has apnea. She has OA in which she takes nabumetone, prn tylenol and gabapentin with good relief.      She reports that her BS are rarely above 100 and denies any symptoms.  She did have a FBS of 125 today and is concerned that this is too high.  She states her last A1c 3 months ago was 6.5    Past Medical History:   Diagnosis Date   ? Anemia    ? Anxiety    ? Atrial fibrillation with RVR (H)    ? B-complex deficiency    ? Cellulitis of right lower extremity    ? DDD (degenerative disc disease), lumbar    ? Depressive disorder    ? DM2 (diabetes mellitus, type 2) (H)    ? Eating disorder    ? GERD (gastroesophageal reflux disease)    ? Heart murmur    ? HLD (hyperlipidemia)    ? HTN (hypertension)    ? Hypovitaminosis D    ? Iron deficiency anemia secondary to inadequate dietary iron intake    ? Morbid obesity (H)    ? OA (osteoarthritis)    ? Restless leg    ? S/P gastric bypass    ? Septic shock (H)    ? Sinusitis    ? Sleep apnea    ? Sleep disturbance      Past Surgical History:   Procedure Laterality Date   ?  SECTION     ? CHOLECYSTECTOMY     ? GASTRIC BYPASS     ? HERNIA REPAIR       Family History   Problem Relation Age of Onset   ? Cancer Father         bladder   ? Sleep apnea Father    ? Throat cancer Paternal Grandfather    ? Stomach cancer Paternal Grandfather      Social History     Socioeconomic History   ? Marital status: Single      Spouse name: Not on file   ? Number of children: Not on file   ? Years of education: Not on file   ? Highest education level: Not on file   Occupational History   ? Not on file   Social Needs   ? Financial resource strain: Not on file   ? Food insecurity     Worry: Not on file     Inability: Not on file   ? Transportation needs     Medical: Not on file     Non-medical: Not on file   Tobacco Use   ? Smoking status: Never Smoker   ? Smokeless tobacco: Never Used   Substance and Sexual Activity   ? Alcohol use: Not Currently   ? Drug use: Not on file   ? Sexual activity: Not on file   Lifestyle   ? Physical activity     Days per week: Not on file     Minutes per session: Not on file   ? Stress: Not on file   Relationships   ? Social connections     Talks on phone: Not on file     Gets together: Not on file     Attends Rastafarian service: Not on file     Active member of club or organization: Not on file     Attends meetings of clubs or organizations: Not on file     Relationship status: Not on file   ? Intimate partner violence     Fear of current or ex partner: Not on file     Emotionally abused: Not on file     Physically abused: Not on file     Forced sexual activity: Not on file   Other Topics Concern   ? Not on file   Social History Narrative   ? Not on file       Current Outpatient Medications   Medication Sig Dispense Refill   ? calcium, as carbonate, (TUMS) 200 mg calcium (500 mg) chewable tablet Chew 2 tablets 4 (four) times a day as needed for heartburn.     ? acetaminophen (TYLENOL) 500 MG tablet Take 1,000 mg by mouth 2 (two) times a day as needed for pain.     ? apixaban ANTICOAGULANT (ELIQUIS) 5 mg Tab tablet Take 5 mg by mouth 2 (two) times a day.     ? ascorbic acid, vitamin C, (VITAMIN C) 1000 MG tablet Take 1,000 mg by mouth daily.     ? atorvastatin (LIPITOR) 40 MG tablet Take 40 mg by mouth at bedtime.     ? calcium carbonate-vitamin D3 (CALTRATE 600 PLUS D3) 600 mg(1,500mg) -400 unit per tablet  Take 1 tablet by mouth daily.     ? cephalexin (KEFLEX) 500 MG capsule Take 1,000 mg by mouth every 6 (six) hours.     ? cyanocobalamin 1000 MCG tablet Take 1,000 mcg by mouth daily.     ? ferrous sulfate 325 (65 FE) MG tablet Take 1 tablet by mouth 2 (two) times a day with meals.     ? furosemide (LASIX) 40 MG tablet Take 40 mg by mouth daily.     ? gabapentin (NEURONTIN) 100 MG capsule Take 100 mg by mouth 2 (two) times a day.     ? magnesium oxide 250 mg magnesium Tab Take 250 mg by mouth 2 (two) times a day.     ? metFORMIN (GLUCOPHAGE) 500 MG tablet Take 1,000 mg by mouth 2 (two) times a day with meals.     ? methylsulfonylmethane (MSM) 1,000 mg Tab Take 1,000 mg by mouth every morning.     ? metoprolol succinate (TOPROL-XL) 100 MG 24 hr tablet Take 100 mg by mouth daily.     ? multivitamin (MULTIPLE VITAMINS ORAL) Take 1 tablet by mouth every morning.     ? nabumetone (RELAFEN) 750 MG tablet Take 750 mg by mouth 2 (two) times a day with meals.     ? omeprazole (PRILOSEC) 20 MG capsule Take 20 mg by mouth daily before breakfast.     ? OXcarbazepine (TRILEPTAL) 150 MG tablet Take 150 mg by mouth 2 (two) times a day.     ? PARoxetine (PAXIL) 20 MG tablet Take 60 mg by mouth every morning.     ? traZODone (DESYREL) 150 MG tablet Take 150 mg by mouth at bedtime.       No current facility-administered medications for this visit.      No Known Allergies    There is no immunization history on file for this patient.    Post Discharge Medication Reconciliation Status: discharge medications reconciled, continue medications without change    Review of Systems   Patient denies fever, chills, headache, lightheadedness, dizziness, rhinorrhea, cough, congestion, shortness of breath, chest pain, palpitations, abdominal pain, n/v, constipation, change in appetite, dysuria, frequency, burning or pain with urination.  Other than stated in HPI all other review of systems is negative.         Physical Exam   Vital signs: /91  with most -120s, , resp 16, 96% on RA  GENERAL APPEARANCE: Well developed, well nourished, in no acute distress.  HEENT: normocephalic, atraumatic  PERRL, sclerae anicteric, conjunctivae clear and moist, EOM intact  LUNGS: Lung sounds CTA, no adventitious sounds, respiratory effort normal.  CARD:irregular but controlled, S1, S2, without murmurs, gallops, rubs,   ABD: Soft and nontender with normal bowel sounds. Multiple scabs with open area LLQ   MSK: Muscle strength and tone were equal bilaterally  EXTREMITIES: lymphedema BLE  NEURO: Alert and oriented x 3.  Face is symmetric.  SKIN: blistering and ulceration of RLE with serous drainage, erythema contained to right shin less than the black marks marked in the hospital.  LLE has small minor blistering without drainage.  Wound on LLQ of abdomen is approximately 0.3 x 0.3cm with good granulation.  Abdomen has no erythema.   PSYCH: euthymic            Labs:    No results found for this or any previous visit (from the past 240 hour(s)).      Assessment:  1. Cellulitis of right lower extremity     2. Longstanding persistent atrial fibrillation     3. Lymphedema of both lower extremities     4. Type 2 diabetes mellitus without complication, without long-term current use of insulin (H)     5. Anemia, unspecified type     6. Diarrhea, unspecified type     7. Depression with anxiety     8. Sleep apnea, unspecified type     9. Essential hypertension     10. Hyperlipidemia, unspecified hyperlipidemia type         Plan:   Celllulitis: improving Keflex thru 3/20, check a BMP and CBC    Afib: controlled, continue with home dose Toprol and Eliquis    Lymphedema: counseled patient on the importance of improving lymphedema.  On lasix 40mg daily.  PT to put on lymphedema wraps if they fall of with ambulation could consider Unna boot or profore dressings.  Counseled patient to elevated legs and float heels.     DM: controlled, Metformin    Anemia: Check a CBC continue  with ferous sulfate.     Diarrhea: no diagnosis, IBS vs Celiac. Discussed these dx with patient.  Discussed FODMAP diet and gluten free diet and how to go about starting.  Will check Celiac sprue.  Consider immodium if needed.     Depression/anxiety: sees psychiatry, continue oxcarbazepine, and paxil. On trazodone for sleep .     Sleep apnea: will need to follow up for CPAP.  Discussed not adding Benadryl for itching as she is already on a highly sedating sleep medication.     HTN: Stable, continue metoprolol.     HLD: will defer to primary as she is not on a statin.      35 total minutes spent with 25 minutes spent face to face with patient in counseling and coordination of the above plan of care.     Electronically signed by: Caty Wheat CNP

## 2021-06-20 NOTE — LETTER
Letter by Johnson, Michael Duane, CNP at      Author: Johnson, Michael Duane, CNP Service: -- Author Type: --    Filed:  Encounter Date: 3/20/2020 Status: (Other)         Patient: Nguyen Trejo   MR Number: [id number]   YOB: 1970   Date of Visit: 3/20/2020     [facility]    Facility:   [facility] [[id number]]   Code Status: FULL CODE      CHIEF COMPLAINT/REASON FOR VISIT:  Chief Complaint   Patient presents with   ? Follow Up     rehab, legs, lymphed, dm, htn, weak       HISTORY:      HPI: Nguyen is a 49 y.o. female who I had the pleasure to visit with secondary to her hospitalization March 10, 2020 through March 17, 2020 secondary to septic shock secondary to bilateral lower extremity and abdominal wall.  The septic shock did resolve.  The cellulitis was bilateral lower extremities with an abdominal wall cellulitis.  She is obese.  She also is on chronic anticoagulation with apixaban for atrial fibrillation.  She also did have diarrhea and C. difficile was negative.  The kidneys were evaluated secondary to the septic shock.  The lactic acidosis was resolved.  Does have a history of sleep apnea does not use a CPAP machine.  The hypokalemia was corrected as well as the hyponatremia.  The metformin was held for her diabetes.  She also does have depression and anxiety.  Sent to the transitional care unit secondary to deconditioning.  She currently is in the transitional care unit.  Had a pleasant conversation with her as well.  She has been normotensive and afebrile and also on room air.  She is on Eliquis 5 mg twice daily.  Her hemoglobin did come back today at 9.7 she is on ferrous sulfate twice daily.  For chronic pain she is on gabapentin 100 mg twice daily as well as nabumetone 750 mg twice daily.  For the hypertension as well as lymphedema is on furosemide 40 mg is also a BMP scheduled for today.  Her moods have been stable and does see a therapist.   Regarding her diabetes the Accu-Cheks in the morning ranging 90-1 25 and towards the p.m. 93-1 01.  She does live out here in the [place] area in an apartment.  She does live with her daughter.  She does have a couple of stairs and normally is able to use a crutch for the stairs but the therapist are working with a walker.  Currently the left leg looks unremarkable except for lymphedema.  There is no cellulitis in the abdominal wall and she does have a pannus.  Then the right lower extremity does have blisters on the lower anterior and posterior legs they are being dressed and treated once daily and there are no secondary infection.  Should be on the Keflex until March 30.    Past Medical History:   Diagnosis Date   ? Anemia    ? Anxiety    ? Atrial fibrillation with RVR (H)    ? B-complex deficiency    ? Cellulitis of right lower extremity    ? DDD (degenerative disc disease), lumbar    ? Depressive disorder    ? DM2 (diabetes mellitus, type 2) (H)    ? Eating disorder    ? GERD (gastroesophageal reflux disease)    ? Heart murmur    ? HLD (hyperlipidemia)    ? HTN (hypertension)    ? Hypovitaminosis D    ? Iron deficiency anemia secondary to inadequate dietary iron intake    ? Morbid obesity (H)    ? OA (osteoarthritis)    ? Restless leg    ? S/P gastric bypass    ? Septic shock (H)    ? Sinusitis    ? Sleep apnea    ? Sleep disturbance              Family History   Problem Relation Age of Onset   ? Cancer Father         bladder   ? Sleep apnea Father    ? Throat cancer Paternal Grandfather    ? Stomach cancer Paternal Grandfather      Social History     Socioeconomic History   ? Marital status: Single     Spouse name: Not on file   ? Number of children: Not on file   ? Years of education: Not on file   ? Highest education level: Not on file   Occupational History   ? Not on file   Social Needs   ? Financial resource strain: Not on file   ? Food insecurity     Worry: Not on file     Inability: Not on file   ?  Transportation needs     Medical: Not on file     Non-medical: Not on file   Tobacco Use   ? Smoking status: Never Smoker   ? Smokeless tobacco: Never Used   Substance and Sexual Activity   ? Alcohol use: Not Currently   ? Drug use: Not on file   ? Sexual activity: Not on file   Lifestyle   ? Physical activity     Days per week: Not on file     Minutes per session: Not on file   ? Stress: Not on file   Relationships   ? Social connections     Talks on phone: Not on file     Gets together: Not on file     Attends Pent[employer]al service: Not on file     Active member of club or organization: Not on file     Attends meetings of clubs or organizations: Not on file     Relationship status: Not on file   ? Intimate partner violence     Fear of current or ex partner: Not on file     Emotionally abused: Not on file     Physically abused: Not on file     Forced sexual activity: Not on file   Other Topics Concern   ? Not on file   Social History Narrative   ? Not on file         Review of Systems  She currently denies any new symptoms of chills or fever coughing wheezing chest pain dizziness vertigo nausea vomiting diarrhea dysuria flulike symptoms headache or stiff neck.  History of obesity sleep apnea gastric bypass surgery diabetes A. fib hypertension and hyperlipidemia.    Current Outpatient Medications   Medication Sig   ? acetaminophen (TYLENOL) 500 MG tablet Take 1,000 mg by mouth 2 (two) times a day as needed for pain.   ? apixaban ANTICOAGULANT (ELIQUIS) 5 mg Tab tablet Take 5 mg by mouth 2 (two) times a day.   ? ascorbic acid, vitamin C, (VITAMIN C) 1000 MG tablet Take 1,000 mg by mouth daily.   ? atorvastatin (LIPITOR) 40 MG tablet Take 40 mg by mouth at bedtime.   ? calcium carbonate-vitamin D3 (CALTRATE 600 PLUS D3) 600 mg(1,500mg) -400 unit per tablet Take 1 tablet by mouth daily.   ? calcium, as carbonate, (TUMS) 200 mg calcium (500 mg) chewable tablet Chew 2 tablets 4 (four) times a day as needed for heartburn.   ?  cephalexin (KEFLEX) 500 MG capsule Take 1,000 mg by mouth every 6 (six) hours.   ? cyanocobalamin 1000 MCG tablet Take 1,000 mcg by mouth daily.   ? ferrous sulfate 325 (65 FE) MG tablet Take 1 tablet by mouth 2 (two) times a day with meals.   ? furosemide (LASIX) 40 MG tablet Take 40 mg by mouth daily.   ? gabapentin (NEURONTIN) 100 MG capsule Take 100 mg by mouth 2 (two) times a day.   ? magnesium oxide 250 mg magnesium Tab Take 250 mg by mouth 2 (two) times a day.   ? metFORMIN (GLUCOPHAGE) 500 MG tablet Take 1,000 mg by mouth 2 (two) times a day with meals.   ? methylsulfonylmethane (MSM) 1,000 mg Tab Take 1,000 mg by mouth every morning.   ? metoprolol succinate (TOPROL-XL) 100 MG 24 hr tablet Take 100 mg by mouth daily.   ? multivitamin (MULTIPLE VITAMINS ORAL) Take 1 tablet by mouth every morning.   ? nabumetone (RELAFEN) 750 MG tablet Take 750 mg by mouth 2 (two) times a day with meals.   ? omeprazole (PRILOSEC) 20 MG capsule Take 20 mg by mouth daily before breakfast.   ? OXcarbazepine (TRILEPTAL) 150 MG tablet Take 150 mg by mouth 2 (two) times a day.   ? PARoxetine (PAXIL) 20 MG tablet Take 60 mg by mouth every morning.   ? traZODone (DESYREL) 150 MG tablet Take 150 mg by mouth at bedtime.       There were no vitals filed for this visit.  Blood pressure 116/72 pulse 79 respirations 18 temperature 96.8 saturation room air 98%  Physical Exam  Head is normocephalic.  Conjunctiva is pink sclerae clear.  Neck is supple without adenopathy.  Lung sounds are clear throughout.  Cardiovascular does have a slight irregularity.  Also does have chronic lower extremity lymphedema.  Gastrointestinal nontender protuberant with a pannus.  No secondary infection or cellulitis.  Psychiatric: Pleasant affect.  LABS:   Lab Results   Component Value Date    WBC 7.8 03/20/2020    HGB 9.7 (L) 03/20/2020    HCT 32.7 (L) 03/20/2020    MCV 84 03/20/2020     03/20/2020     Results for orders placed or performed in visit on  03/20/20   Basic Metabolic Panel   Result Value Ref Range    Sodium 138 136 - 145 mmol/L    Potassium 3.6 3.5 - 5.0 mmol/L    Chloride 100 98 - 107 mmol/L    CO2 32 (H) 22 - 31 mmol/L    Anion Gap, Calculation 6 5 - 18 mmol/L    Glucose 88 70 - 125 mg/dL    Calcium 8.9 8.5 - 10.5 mg/dL    BUN 9 8 - 22 mg/dL    Creatinine 0.71 0.60 - 1.10 mg/dL    GFR MDRD Af Amer >60 >60 mL/min/1.73m2    GFR MDRD Non Af Amer >60 >60 mL/min/1.73m2       No results found for: ALT, AST, GGT, ALKPHOS, BILITOT  No results found for: LABPROT, ALBUMIN      ASSESSMENT:      ICD-10-CM    1. Cellulitis of right lower extremity  L03.115    2. Type 2 diabetes mellitus without complication, with long-term current use of insulin (H)  E11.9     Z79.4    3. Hypertension, unspecified type  I10    4. Lymphedema  I89.0        PLAN:    Continue to manage and follow her chronic medical conditions seems pretty stable up to this point.  We will add vitamin C and zinc secondary to the wounds but also for the anemia.  Continue to monitor blood pressures and her blood sugars as well as the rehabilitation process.  She is glad that she is here to work on her therapy as well as doing stairs at home working on her strength and overall conditioning.  Had a pleasant visit with her today and she did not have any other questions or other concerns.    For documentation purposes total visit 35 minutes which over 50% was spent with the patient going over her care her medications her stay in the hospital recent laboratory studies and coordination of care.      Electronically signed by: Michael Duane Johnson, ANDRÉS

## 2021-06-20 NOTE — LETTER
Letter by Johnson, Michael Duane, CNP at      Author: Johnson, Michael Duane, CNP Service: -- Author Type: --    Filed:  Encounter Date: 3/27/2020 Status: (Other)         Patient: Nguyen Trejo   MR Number: [id number]   YOB: 1970   Date of Visit: 3/27/2020     [facility]    Facility:   [facility] [[id number]]   Code Status: FULL CODE      CHIEF COMPLAINT/REASON FOR VISIT:  Chief Complaint   Patient presents with   ? Follow Up     rehab, legs, wounds, dm, htn,       HISTORY:      HPI: Nguyen is a 49 y.o. female who I had the opportunity to revisit with secondary to not only her chronic medical conditions but also recent hospitalization March 10-17, 2020 secondary septic shock from bilateral lower extremity and abdominal wound cellulitis.  She does have lymphedema and does have primarily drying blisters on her right lower extremity.  There is no abdominal wounds.  Will finish up her cephalexin on the 30th.  The leg wounds look pretty good there is currently being treated with xerofoam and then a Kerlix dressing.  I did debride these dried wounds today.  The base looks wonderful on the bed looks wonderful and there is no secondary infection so at this point we need to dry them out so we will put a Telfa dressing to absorb the secretions and the wound itself and then wrapped it with Kerlix and clean it up twice daily with saline.  Otherwise she is not having any discomfort.  She has been normotensive and afebrile.  Blood sugars in the morning ranging 92-1 26 and towards the p.m. 89-1 24.  She now is performing 8 stairs and that was her goals goals to be able to go home soon.  Earlier in the week she is only doing 7 stairs.  Her appetite is good.  There have been no colds or flus or other problems.  Is able to use walker.    Past Medical History:   Diagnosis Date   ? Anemia    ? Anxiety    ? Atrial fibrillation with RVR (H)    ? B-complex deficiency    ?  Cellulitis of right lower extremity    ? DDD (degenerative disc disease), lumbar    ? Depressive disorder    ? DM2 (diabetes mellitus, type 2) (H)    ? Eating disorder    ? GERD (gastroesophageal reflux disease)    ? Heart murmur    ? HLD (hyperlipidemia)    ? HTN (hypertension)    ? Hypovitaminosis D    ? Iron deficiency anemia secondary to inadequate dietary iron intake    ? Morbid obesity (H)    ? OA (osteoarthritis)    ? Restless leg    ? S/P gastric bypass    ? Septic shock (H)    ? Sinusitis    ? Sleep apnea    ? Sleep disturbance              Family History   Problem Relation Age of Onset   ? Cancer Father         bladder   ? Sleep apnea Father    ? Throat cancer Paternal Grandfather    ? Stomach cancer Paternal Grandfather      Social History     Socioeconomic History   ? Marital status: Single     Spouse name: Not on file   ? Number of children: Not on file   ? Years of education: Not on file   ? Highest education level: Not on file   Occupational History   ? Not on file   Social Needs   ? Financial resource strain: Not on file   ? Food insecurity     Worry: Not on file     Inability: Not on file   ? Transportation needs     Medical: Not on file     Non-medical: Not on file   Tobacco Use   ? Smoking status: Never Smoker   ? Smokeless tobacco: Never Used   Substance and Sexual Activity   ? Alcohol use: Not Currently   ? Drug use: Not on file   ? Sexual activity: Not on file   Lifestyle   ? Physical activity     Days per week: Not on file     Minutes per session: Not on file   ? Stress: Not on file   Relationships   ? Social connections     Talks on phone: Not on file     Gets together: Not on file     Attends [employer]ism service: Not on file     Active member of club or organization: Not on file     Attends meetings of clubs or organizations: Not on file     Relationship status: Not on file   ? Intimate partner violence     Fear of current or ex partner: Not on file     Emotionally abused: Not on file      Physically abused: Not on file     Forced sexual activity: Not on file   Other Topics Concern   ? Not on file   Social History Narrative   ? Not on file         Review of Systems  She currently denies any new symptoms of chills or fever coughing wheezing chest pain dizziness vertigo nausea vomiting diarrhea dysuria flulike symptoms headache or stiff neck.  History of obesity sleep apnea gastric bypass surgery diabetes A. fib hypertension and hyperlipidemia.         Current Outpatient Medications:   ?  acetaminophen (TYLENOL) 500 MG tablet, Take 1,000 mg by mouth 2 (two) times a day as needed for pain., Disp: , Rfl:   ?  apixaban ANTICOAGULANT (ELIQUIS) 5 mg Tab tablet, Take 5 mg by mouth 2 (two) times a day., Disp: , Rfl:   ?  ascorbic acid, vitamin C, (VITAMIN C) 1000 MG tablet, Take 1,000 mg by mouth daily., Disp: , Rfl:   ?  atorvastatin (LIPITOR) 40 MG tablet, Take 40 mg by mouth at bedtime., Disp: , Rfl:   ?  calcium carbonate-vitamin D3 (CALTRATE 600 PLUS D3) 600 mg(1,500mg) -400 unit per tablet, Take 1 tablet by mouth daily., Disp: , Rfl:   ?  calcium, as carbonate, (TUMS) 200 mg calcium (500 mg) chewable tablet, Chew 2 tablets 4 (four) times a day as needed for heartburn., Disp: , Rfl:   ?  cephalexin (KEFLEX) 500 MG capsule, Take 1,000 mg by mouth every 6 (six) hours., Disp: , Rfl:   ?  cyanocobalamin 1000 MCG tablet, Take 1,000 mcg by mouth daily., Disp: , Rfl:   ?  ferrous sulfate 325 (65 FE) MG tablet, Take 1 tablet by mouth 2 (two) times a day with meals., Disp: , Rfl:   ?  furosemide (LASIX) 40 MG tablet, Take 40 mg by mouth daily., Disp: , Rfl:   ?  gabapentin (NEURONTIN) 100 MG capsule, Take 100 mg by mouth 2 (two) times a day., Disp: , Rfl:   ?  magnesium oxide 250 mg magnesium Tab, Take 250 mg by mouth 2 (two) times a day., Disp: , Rfl:   ?  metFORMIN (GLUCOPHAGE) 500 MG tablet, Take 1,000 mg by mouth 2 (two) times a day with meals., Disp: , Rfl:   ?  methylsulfonylmethane (MSM) 1,000 mg Tab, Take  1,000 mg by mouth every morning., Disp: , Rfl:   ?  metoprolol succinate (TOPROL-XL) 100 MG 24 hr tablet, Take 100 mg by mouth daily., Disp: , Rfl:   ?  multivitamin (MULTIPLE VITAMINS ORAL), Take 1 tablet by mouth every morning., Disp: , Rfl:   ?  nabumetone (RELAFEN) 750 MG tablet, Take 750 mg by mouth 2 (two) times a day with meals., Disp: , Rfl:   ?  omeprazole (PRILOSEC) 20 MG capsule, Take 20 mg by mouth daily before breakfast., Disp: , Rfl:   ?  OXcarbazepine (TRILEPTAL) 150 MG tablet, Take 150 mg by mouth 2 (two) times a day., Disp: , Rfl:   ?  PARoxetine (PAXIL) 20 MG tablet, Take 60 mg by mouth every morning., Disp: , Rfl:   ?  traZODone (DESYREL) 150 MG tablet, Take 150 mg by mouth at bedtime., Disp: , Rfl:     There were no vitals filed for this visit.  Blood pressure 114/71 pulse 84 respirations 18 temperature 98.5  Physical Exam  Head is normocephalic.  Conjunctiva is pink sclerae clear.  Neck is supple without adenopathy.  Lung sounds are clear throughout. Cardiovascular does have an irregularity.  Also does have chronic lower extremity lymphedema.  Gastrointestinal nontender protuberant with a pannus.  No secondary infection or cellulitis.  Psychiatric: Pleasant affect.    Primarily the right lower extremity does have the blisters which are now resolving and drying out as well as being cleansed and treated twice daily.  LABS:   Lab Results   Component Value Date    WBC 7.8 03/20/2020    HGB 9.7 (L) 03/20/2020    HCT 32.7 (L) 03/20/2020    MCV 84 03/20/2020     03/20/2020     Results for orders placed or performed in visit on 03/20/20   Basic Metabolic Panel   Result Value Ref Range    Sodium 138 136 - 145 mmol/L    Potassium 3.6 3.5 - 5.0 mmol/L    Chloride 100 98 - 107 mmol/L    CO2 32 (H) 22 - 31 mmol/L    Anion Gap, Calculation 6 5 - 18 mmol/L    Glucose 88 70 - 125 mg/dL    Calcium 8.9 8.5 - 10.5 mg/dL    BUN 9 8 - 22 mg/dL    Creatinine 0.71 0.60 - 1.10 mg/dL    GFR MDRD Af Amer >60 >60  mL/min/1.73m2    GFR MDRD Non Af Amer >60 >60 mL/min/1.73m2       No results found for: HGBA1C      ASSESSMENT:      ICD-10-CM    1. Wounds, multiple  T07.XXXA    2. Type 2 diabetes mellitus without complication, with long-term current use of insulin (H)  E11.9     Z79.4    3. Leg weakness, bilateral  R29.898    4. Hypertension, unspecified type  I10        PLAN:    I was able to do wound debriding on his right lower extremity wounds today.  The actually pretty good she finished up her cephalexin.  Otherwise blood pressures and blood sugars look pretty good.  She is also made improvements with therapy.    Documentation purposes total visit 35 minutes which over 50% was spent talking with patient about her chronic medical conditions her stay in the transitional care but also doing wound care and debriding the right lower extremity wounds.      Electronically signed by: Michael Duane Johnson, CNP

## 2021-06-20 NOTE — LETTER
Letter by Johnson, Michael Duane, CNP at      Author: Johnson, Michael Duane, CNP Service: -- Author Type: --    Filed:  Encounter Date: 3/25/2020 Status: (Other)         Patient: Nguyen Trejo   MR Number: [id number]   YOB: 1970   Date of Visit: 3/25/2020     [facility]    Facility:   [facility] [[id number]]   Code Status: FULL CODE      CHIEF COMPLAINT/REASON FOR VISIT:  Chief Complaint   Patient presents with   ? Follow Up     rehab, legs, wounds, htn, dm       HISTORY:      HPI: Nguyen is a 49 y.o. female who I had the pleasure revisiting with secondary to her hospitalization March 10, 2020 through March 17, 2020 secondary septic shock secondary to bilateral lower extremity and abdominal wall cellulitis.  The abdominal wall is no longer an issue.  The right is greater than the left regarding some blisters as well as wounds but they are starting to scab and heal and she will be finishing up her cephalexin on March 30.  Otherwise no other pain or other problems.  She is on Eliquis secondary to A. fib.  She also does have chronic anemia see results below of her most recent hemoglobin is on ferrous sulfate twice daily.  For chronic pain on gabapentin.  For sleep is on trazodone.  Her blood sugars in the morning have been ranging 92-1 19 and towards the p.m. 89-1 24 only on metformin.  For the lymphedema is on furosemide 40 mg daily.  She has been normotensive and afebrile.  Moods are stable.  She is now going up about 7 stairs she needs to do a treadmill to go home she does figure that should be reviewed that by this weekend she like to be discharged by the weekend.  She does live in an apartment and has a daughter.  Earlier in the week she is only able to do 2 stairs.    Past Medical History:   Diagnosis Date   ? Anemia    ? Anxiety    ? Atrial fibrillation with RVR (H)    ? B-complex deficiency    ? Cellulitis of right lower extremity    ? DDD  (degenerative disc disease), lumbar    ? Depressive disorder    ? DM2 (diabetes mellitus, type 2) (H)    ? Eating disorder    ? GERD (gastroesophageal reflux disease)    ? Heart murmur    ? HLD (hyperlipidemia)    ? HTN (hypertension)    ? Hypovitaminosis D    ? Iron deficiency anemia secondary to inadequate dietary iron intake    ? Morbid obesity (H)    ? OA (osteoarthritis)    ? Restless leg    ? S/P gastric bypass    ? Septic shock (H)    ? Sinusitis    ? Sleep apnea    ? Sleep disturbance              Family History   Problem Relation Age of Onset   ? Cancer Father         bladder   ? Sleep apnea Father    ? Throat cancer Paternal Grandfather    ? Stomach cancer Paternal Grandfather      Social History     Socioeconomic History   ? Marital status: Single     Spouse name: Not on file   ? Number of children: Not on file   ? Years of education: Not on file   ? Highest education level: Not on file   Occupational History   ? Not on file   Social Needs   ? Financial resource strain: Not on file   ? Food insecurity     Worry: Not on file     Inability: Not on file   ? Transportation needs     Medical: Not on file     Non-medical: Not on file   Tobacco Use   ? Smoking status: Never Smoker   ? Smokeless tobacco: Never Used   Substance and Sexual Activity   ? Alcohol use: Not Currently   ? Drug use: Not on file   ? Sexual activity: Not on file   Lifestyle   ? Physical activity     Days per week: Not on file     Minutes per session: Not on file   ? Stress: Not on file   Relationships   ? Social connections     Talks on phone: Not on file     Gets together: Not on file     Attends Advent service: Not on file     Active member of club or organization: Not on file     Attends meetings of clubs or organizations: Not on file     Relationship status: Not on file   ? Intimate partner violence     Fear of current or ex partner: Not on file     Emotionally abused: Not on file     Physically abused: Not on file     Forced sexual  activity: Not on file   Other Topics Concern   ? Not on file   Social History Narrative   ? Not on file         Review of Systems  She currently denies any new symptoms of chills or fever coughing wheezing chest pain dizziness vertigo nausea vomiting diarrhea dysuria flulike symptoms headache or stiff neck.  History of obesity sleep apnea gastric bypass surgery diabetes A. fib hypertension and hyperlipidemia.    Current Outpatient Medications   Medication Sig   ? acetaminophen (TYLENOL) 500 MG tablet Take 1,000 mg by mouth 2 (two) times a day as needed for pain.   ? apixaban ANTICOAGULANT (ELIQUIS) 5 mg Tab tablet Take 5 mg by mouth 2 (two) times a day.   ? ascorbic acid, vitamin C, (VITAMIN C) 1000 MG tablet Take 1,000 mg by mouth daily.   ? atorvastatin (LIPITOR) 40 MG tablet Take 40 mg by mouth at bedtime.   ? calcium carbonate-vitamin D3 (CALTRATE 600 PLUS D3) 600 mg(1,500mg) -400 unit per tablet Take 1 tablet by mouth daily.   ? calcium, as carbonate, (TUMS) 200 mg calcium (500 mg) chewable tablet Chew 2 tablets 4 (four) times a day as needed for heartburn.   ? cephalexin (KEFLEX) 500 MG capsule Take 1,000 mg by mouth every 6 (six) hours.   ? cyanocobalamin 1000 MCG tablet Take 1,000 mcg by mouth daily.   ? ferrous sulfate 325 (65 FE) MG tablet Take 1 tablet by mouth 2 (two) times a day with meals.   ? furosemide (LASIX) 40 MG tablet Take 40 mg by mouth daily.   ? gabapentin (NEURONTIN) 100 MG capsule Take 100 mg by mouth 2 (two) times a day.   ? magnesium oxide 250 mg magnesium Tab Take 250 mg by mouth 2 (two) times a day.   ? metFORMIN (GLUCOPHAGE) 500 MG tablet Take 1,000 mg by mouth 2 (two) times a day with meals.   ? methylsulfonylmethane (MSM) 1,000 mg Tab Take 1,000 mg by mouth every morning.   ? metoprolol succinate (TOPROL-XL) 100 MG 24 hr tablet Take 100 mg by mouth daily.   ? multivitamin (MULTIPLE VITAMINS ORAL) Take 1 tablet by mouth every morning.   ? nabumetone (RELAFEN) 750 MG tablet Take 750 mg  by mouth 2 (two) times a day with meals.   ? omeprazole (PRILOSEC) 20 MG capsule Take 20 mg by mouth daily before breakfast.   ? OXcarbazepine (TRILEPTAL) 150 MG tablet Take 150 mg by mouth 2 (two) times a day.   ? PARoxetine (PAXIL) 20 MG tablet Take 60 mg by mouth every morning.   ? traZODone (DESYREL) 150 MG tablet Take 150 mg by mouth at bedtime.       There were no vitals filed for this visit.  Blood pressure 103/61 pulse 76 respirations 18 temperature 97.9 saturation room air 93%  Physical Exam  Head is normocephalic.  Conjunctiva is pink sclerae clear.  Neck is supple without adenopathy.  Lung sounds are clear throughout. Cardiovascular does have anirregularity.  Also does have chronic lower extremity lymphedema.  Gastrointestinal nontender protuberant with a pannus.  No secondary infection or cellulitis.  Psychiatric: Pleasant affect.    LABS:   Lab Results   Component Value Date    WBC 7.8 03/20/2020    HGB 9.7 (L) 03/20/2020    HCT 32.7 (L) 03/20/2020    MCV 84 03/20/2020     03/20/2020     Results for orders placed or performed in visit on 03/20/20   Basic Metabolic Panel   Result Value Ref Range    Sodium 138 136 - 145 mmol/L    Potassium 3.6 3.5 - 5.0 mmol/L    Chloride 100 98 - 107 mmol/L    CO2 32 (H) 22 - 31 mmol/L    Anion Gap, Calculation 6 5 - 18 mmol/L    Glucose 88 70 - 125 mg/dL    Calcium 8.9 8.5 - 10.5 mg/dL    BUN 9 8 - 22 mg/dL    Creatinine 0.71 0.60 - 1.10 mg/dL    GFR MDRD Af Amer >60 >60 mL/min/1.73m2    GFR MDRD Non Af Amer >60 >60 mL/min/1.73m2       No results found for: HGBA1C      ASSESSMENT:      ICD-10-CM    1. Wounds, multiple  T07.XXXA    2. Type 2 diabetes mellitus without complication, with long-term current use of insulin (H)  E11.9     Z79.4    3. Hypertension, unspecified type  I10    4. Lymphedema  I89.0        PLAN:    The legs actually look like they have improved nicely so on cephalexin until March 30.  Blood sugars look good blood pressures look great and the  fact that she is increased now from 2 stairs up to 7 stairs her hope is to do these 8 stairs in the next day or 2 since the plan is to be able to go home by the weekend.      Electronically signed by: Michael Duane Johnson, CNP

## 2021-06-20 NOTE — LETTER
Letter by Johnson, Michael Duane, CNP at      Author: Johnson, Michael Duane, CNP Service: -- Author Type: --    Filed:  Encounter Date: 3/23/2020 Status: (Other)         Patient: Nguyen Trejo   MR Number: [id number]   YOB: 1970   Date of Visit: 3/23/2020     [facility]    Facility:   [facility] [[id number]]   Code Status: FULL CODE      CHIEF COMPLAINT/REASON FOR VISIT:  Chief Complaint   Patient presents with   ? Follow Up     REHAB, legs, wounds, bp, bg       HISTORY:      HPI: Nguyen is a 49 y.o. female who I had the opportunity to revisit with secondary to her hospitalization March 10 through March 17, 2020 secondary septic shock secondary to bilateral lower extremity and abdominal wall cellulitis.  She is actually doing quite well she is performing in therapy 23 stairs at this time she needs to go weight in order to go home.  She like to go home sooner than later.  For chronic pain is on gabapentin 100 mg twice daily rarely takes a Tylenol as needed.  Primarily her right lower extremity does have the blisters which are starting to dry and healed quite nicely they are wrapped.  Does have a chronic history of lymphedema.  She has been normotensive afebrile and also on room air.  Blood sugars in the morning have been ranging 90-1 25 and in the p.m. .  Moods have been stable.  No respiratory issues.  Working with therapy for strength, conditioning, stamina.    Past Medical History:   Diagnosis Date   ? Anemia    ? Anxiety    ? Atrial fibrillation with RVR (H)    ? B-complex deficiency    ? Cellulitis of right lower extremity    ? DDD (degenerative disc disease), lumbar    ? Depressive disorder    ? DM2 (diabetes mellitus, type 2) (H)    ? Eating disorder    ? GERD (gastroesophageal reflux disease)    ? Heart murmur    ? HLD (hyperlipidemia)    ? HTN (hypertension)    ? Hypovitaminosis D    ? Iron deficiency anemia secondary to inadequate dietary  iron intake    ? Morbid obesity (H)    ? OA (osteoarthritis)    ? Restless leg    ? S/P gastric bypass    ? Septic shock (H)    ? Sinusitis    ? Sleep apnea    ? Sleep disturbance              Family History   Problem Relation Age of Onset   ? Cancer Father         bladder   ? Sleep apnea Father    ? Throat cancer Paternal Grandfather    ? Stomach cancer Paternal Grandfather      Social History     Socioeconomic History   ? Marital status: Single     Spouse name: Not on file   ? Number of children: Not on file   ? Years of education: Not on file   ? Highest education level: Not on file   Occupational History   ? Not on file   Social Needs   ? Financial resource strain: Not on file   ? Food insecurity     Worry: Not on file     Inability: Not on file   ? Transportation needs     Medical: Not on file     Non-medical: Not on file   Tobacco Use   ? Smoking status: Never Smoker   ? Smokeless tobacco: Never Used   Substance and Sexual Activity   ? Alcohol use: Not Currently   ? Drug use: Not on file   ? Sexual activity: Not on file   Lifestyle   ? Physical activity     Days per week: Not on file     Minutes per session: Not on file   ? Stress: Not on file   Relationships   ? Social connections     Talks on phone: Not on file     Gets together: Not on file     Attends Jain service: Not on file     Active member of club or organization: Not on file     Attends meetings of clubs or organizations: Not on file     Relationship status: Not on file   ? Intimate partner violence     Fear of current or ex partner: Not on file     Emotionally abused: Not on file     Physically abused: Not on file     Forced sexual activity: Not on file   Other Topics Concern   ? Not on file   Social History Narrative   ? Not on file         Review of Systems  She currently denies any new symptoms of chills or fever coughing wheezing chest pain dizziness vertigo nausea vomiting diarrhea dysuria flulike symptoms headache or stiff neck.  History  of obesity sleep apnea gastric bypass surgery diabetes A. fib hypertension and hyperlipidemia.    Current Outpatient Medications   Medication Sig   ? acetaminophen (TYLENOL) 500 MG tablet Take 1,000 mg by mouth 2 (two) times a day as needed for pain.   ? apixaban ANTICOAGULANT (ELIQUIS) 5 mg Tab tablet Take 5 mg by mouth 2 (two) times a day.   ? ascorbic acid, vitamin C, (VITAMIN C) 1000 MG tablet Take 1,000 mg by mouth daily.   ? atorvastatin (LIPITOR) 40 MG tablet Take 40 mg by mouth at bedtime.   ? calcium carbonate-vitamin D3 (CALTRATE 600 PLUS D3) 600 mg(1,500mg) -400 unit per tablet Take 1 tablet by mouth daily.   ? calcium, as carbonate, (TUMS) 200 mg calcium (500 mg) chewable tablet Chew 2 tablets 4 (four) times a day as needed for heartburn.   ? cephalexin (KEFLEX) 500 MG capsule Take 1,000 mg by mouth every 6 (six) hours.   ? cyanocobalamin 1000 MCG tablet Take 1,000 mcg by mouth daily.   ? ferrous sulfate 325 (65 FE) MG tablet Take 1 tablet by mouth 2 (two) times a day with meals.   ? furosemide (LASIX) 40 MG tablet Take 40 mg by mouth daily.   ? gabapentin (NEURONTIN) 100 MG capsule Take 100 mg by mouth 2 (two) times a day.   ? magnesium oxide 250 mg magnesium Tab Take 250 mg by mouth 2 (two) times a day.   ? metFORMIN (GLUCOPHAGE) 500 MG tablet Take 1,000 mg by mouth 2 (two) times a day with meals.   ? methylsulfonylmethane (MSM) 1,000 mg Tab Take 1,000 mg by mouth every morning.   ? metoprolol succinate (TOPROL-XL) 100 MG 24 hr tablet Take 100 mg by mouth daily.   ? multivitamin (MULTIPLE VITAMINS ORAL) Take 1 tablet by mouth every morning.   ? nabumetone (RELAFEN) 750 MG tablet Take 750 mg by mouth 2 (two) times a day with meals.   ? omeprazole (PRILOSEC) 20 MG capsule Take 20 mg by mouth daily before breakfast.   ? OXcarbazepine (TRILEPTAL) 150 MG tablet Take 150 mg by mouth 2 (two) times a day.   ? PARoxetine (PAXIL) 20 MG tablet Take 60 mg by mouth every morning.   ? traZODone (DESYREL) 150 MG  tablet Take 150 mg by mouth at bedtime.       There were no vitals filed for this visit.  Blood pressure 120/71 pulse 82 respirations 18 temperature 98.0 saturation room air 98%  Physical Exam  Head is normocephalic.  Conjunctiva is pink sclerae clear.  Neck is supple without adenopathy.  Lung sounds are clear throughout. Cardiovascular does have a slight irregularity.  Also does have chronic lower extremity lymphedema.  Gastrointestinal nontender protuberant with a pannus.  No secondary infection or cellulitis.  Psychiatric: Pleasant affect.  LABS:   Lab Results   Component Value Date    WBC 7.8 03/20/2020    HGB 9.7 (L) 03/20/2020    HCT 32.7 (L) 03/20/2020    MCV 84 03/20/2020     03/20/2020     Results for orders placed or performed in visit on 03/20/20   Basic Metabolic Panel   Result Value Ref Range    Sodium 138 136 - 145 mmol/L    Potassium 3.6 3.5 - 5.0 mmol/L    Chloride 100 98 - 107 mmol/L    CO2 32 (H) 22 - 31 mmol/L    Anion Gap, Calculation 6 5 - 18 mmol/L    Glucose 88 70 - 125 mg/dL    Calcium 8.9 8.5 - 10.5 mg/dL    BUN 9 8 - 22 mg/dL    Creatinine 0.71 0.60 - 1.10 mg/dL    GFR MDRD Af Amer >60 >60 mL/min/1.73m2    GFR MDRD Non Af Amer >60 >60 mL/min/1.73m2       No results found for: ALT, AST, GGT, ALKPHOS, BILITOT      ASSESSMENT:      ICD-10-CM    1. Wound of right lower extremity, subsequent encounter  S81.801D    2. Type 2 diabetes mellitus without complication, with long-term current use of insulin (H)  E11.9     Z79.4    3. Hypertension, unspecified type  I10    4. General weakness  R53.1        PLAN:    The right leg in particular is healing up quite nicely with the blisters are being wrapped.  The lymphedema is is not delaying her treatment.  She finished up the cephalexin on March 30.  Blood sugars look good blood pressures look great lower extremity dry skin areas.  Continue with [email or web address] for A. fib.  Continue to work with therapy group for strengthening conditioning to get back to.   He did not have any other questions        Electronically signed by: Michael Duane Johnson, CNP